# Patient Record
Sex: MALE | Race: WHITE | NOT HISPANIC OR LATINO | URBAN - METROPOLITAN AREA
[De-identification: names, ages, dates, MRNs, and addresses within clinical notes are randomized per-mention and may not be internally consistent; named-entity substitution may affect disease eponyms.]

---

## 2019-01-01 ENCOUNTER — APPOINTMENT (INPATIENT)
Dept: RADIOLOGY | Facility: HOSPITAL | Age: 59
DRG: 044 | End: 2019-01-01
Payer: COMMERCIAL

## 2019-01-01 ENCOUNTER — APPOINTMENT (EMERGENCY)
Dept: RADIOLOGY | Facility: HOSPITAL | Age: 59
DRG: 044 | End: 2019-01-01
Payer: COMMERCIAL

## 2019-01-01 ENCOUNTER — HOSPITAL ENCOUNTER (INPATIENT)
Facility: HOSPITAL | Age: 59
LOS: 1 days | DRG: 044 | End: 2019-09-30
Attending: EMERGENCY MEDICINE | Admitting: EMERGENCY MEDICINE
Payer: COMMERCIAL

## 2019-01-01 VITALS
HEIGHT: 73 IN | RESPIRATION RATE: 28 BRPM | SYSTOLIC BLOOD PRESSURE: 143 MMHG | OXYGEN SATURATION: 99 % | WEIGHT: 206.57 LBS | BODY MASS INDEX: 27.38 KG/M2 | TEMPERATURE: 96.4 F | DIASTOLIC BLOOD PRESSURE: 97 MMHG

## 2019-01-01 DIAGNOSIS — I46.9 CARDIAC ARREST (HCC): ICD-10-CM

## 2019-01-01 DIAGNOSIS — E87.2 METABOLIC ACIDOSIS: ICD-10-CM

## 2019-01-01 DIAGNOSIS — I21.09 ANTEROLATERAL MYOCARDIAL INFARCTION (HCC): Primary | ICD-10-CM

## 2019-01-01 DIAGNOSIS — E87.6 HYPOKALEMIA: ICD-10-CM

## 2019-01-01 LAB
ALBUMIN SERPL BCP-MCNC: 3.4 G/DL (ref 3.5–5)
ALBUMIN SERPL BCP-MCNC: 3.4 G/DL (ref 3.5–5)
ALP SERPL-CCNC: 107 U/L (ref 46–116)
ALP SERPL-CCNC: 115 U/L (ref 46–116)
ALT SERPL W P-5'-P-CCNC: 102 U/L (ref 12–78)
ALT SERPL W P-5'-P-CCNC: 158 U/L (ref 12–78)
ANION GAP BLD CALC-SCNC: 21 MMOL/L (ref 4–13)
ANION GAP SERPL CALCULATED.3IONS-SCNC: 14 MMOL/L (ref 4–13)
ANION GAP SERPL CALCULATED.3IONS-SCNC: 18 MMOL/L (ref 4–13)
AST SERPL W P-5'-P-CCNC: 236 U/L (ref 5–45)
AST SERPL W P-5'-P-CCNC: 97 U/L (ref 5–45)
BASE EXCESS BLDA CALC-SCNC: -14 MMOL/L (ref -2–3)
BASE EXCESS BLDA CALC-SCNC: -7.2 MMOL/L
BASOPHILS # BLD MANUAL: 0 THOUSAND/UL (ref 0–0.1)
BASOPHILS NFR MAR MANUAL: 0 % (ref 0–1)
BILIRUB DIRECT SERPL-MCNC: 0.24 MG/DL (ref 0–0.2)
BILIRUB SERPL-MCNC: 0.71 MG/DL (ref 0.2–1)
BILIRUB SERPL-MCNC: 0.87 MG/DL (ref 0.2–1)
BODY TEMPERATURE: 94.8 DEGREES FEHRENHEIT
BUN BLD-MCNC: 17 MG/DL (ref 5–25)
BUN SERPL-MCNC: 17 MG/DL (ref 5–25)
BUN SERPL-MCNC: 23 MG/DL (ref 5–25)
CA-I BLD-SCNC: 1.09 MMOL/L (ref 1.12–1.32)
CA-I BLD-SCNC: 1.09 MMOL/L (ref 1.12–1.32)
CALCIUM SERPL-MCNC: 8.4 MG/DL (ref 8.3–10.1)
CALCIUM SERPL-MCNC: 8.8 MG/DL (ref 8.3–10.1)
CHLORIDE BLD-SCNC: 101 MMOL/L (ref 100–108)
CHLORIDE SERPL-SCNC: 104 MMOL/L (ref 100–108)
CHLORIDE SERPL-SCNC: 105 MMOL/L (ref 100–108)
CK MB SERPL-MCNC: 8.1 % (ref 0–2.5)
CK MB SERPL-MCNC: 81.8 NG/ML (ref 0–5)
CK SERPL-CCNC: 1016 U/L (ref 39–308)
CO2 SERPL-SCNC: 21 MMOL/L (ref 21–32)
CO2 SERPL-SCNC: 21 MMOL/L (ref 21–32)
CREAT BLD-MCNC: 1.5 MG/DL (ref 0.6–1.3)
CREAT SERPL-MCNC: 1.93 MG/DL (ref 0.6–1.3)
CREAT SERPL-MCNC: 2.13 MG/DL (ref 0.6–1.3)
EOSINOPHIL # BLD MANUAL: 0 THOUSAND/UL (ref 0–0.4)
EOSINOPHIL NFR BLD MANUAL: 0 % (ref 0–6)
ERYTHROCYTE [DISTWIDTH] IN BLOOD BY AUTOMATED COUNT: 13.5 % (ref 11.6–15.1)
ERYTHROCYTE [DISTWIDTH] IN BLOOD BY AUTOMATED COUNT: 13.5 % (ref 11.6–15.1)
GFR SERPL CREATININE-BSD FRML MDRD: 33 ML/MIN/1.73SQ M
GFR SERPL CREATININE-BSD FRML MDRD: 37 ML/MIN/1.73SQ M
GFR SERPL CREATININE-BSD FRML MDRD: 51 ML/MIN/1.73SQ M
GLUCOSE SERPL-MCNC: 386 MG/DL (ref 65–140)
GLUCOSE SERPL-MCNC: 430 MG/DL (ref 65–140)
GLUCOSE SERPL-MCNC: 483 MG/DL (ref 65–140)
GLUCOSE SERPL-MCNC: 494 MG/DL (ref 65–140)
GLUCOSE SERPL-MCNC: 501 MG/DL (ref 65–140)
HCO3 BLDA-SCNC: 18.7 MMOL/L (ref 24–30)
HCO3 BLDA-SCNC: 20.2 MMOL/L (ref 22–28)
HCT VFR BLD AUTO: 47.3 % (ref 36.5–49.3)
HCT VFR BLD AUTO: 48 % (ref 36.5–49.3)
HCT VFR BLD CALC: 45 % (ref 36.5–49.3)
HCT VFR BLD CALC: 46 % (ref 36.5–49.3)
HGB BLD-MCNC: 13.9 G/DL (ref 12–17)
HGB BLD-MCNC: 15.2 G/DL (ref 12–17)
HGB BLDA-MCNC: 15.3 G/DL (ref 12–17)
HGB BLDA-MCNC: 15.6 G/DL (ref 12–17)
HOROWITZ INDEX BLDA+IHG-RTO: 100 MM[HG]
LACTATE SERPL-SCNC: 12.3 MMOL/L (ref 0.5–2)
LACTATE SERPL-SCNC: 6.8 MMOL/L (ref 0.5–2)
LYMPHOCYTES # BLD AUTO: 14 % (ref 14–44)
LYMPHOCYTES # BLD AUTO: 3.06 THOUSAND/UL (ref 0.6–4.47)
MAGNESIUM SERPL-MCNC: 2.5 MG/DL (ref 1.6–2.6)
MCH RBC QN AUTO: 27.5 PG (ref 26.8–34.3)
MCH RBC QN AUTO: 27.7 PG (ref 26.8–34.3)
MCHC RBC AUTO-ENTMCNC: 29.4 G/DL (ref 31.4–37.4)
MCHC RBC AUTO-ENTMCNC: 31.7 G/DL (ref 31.4–37.4)
MCV RBC AUTO: 88 FL (ref 82–98)
MCV RBC AUTO: 94 FL (ref 82–98)
MONOCYTES # BLD AUTO: 1.09 THOUSAND/UL (ref 0–1.22)
MONOCYTES NFR BLD: 5 % (ref 4–12)
MYELOCYTES NFR BLD MANUAL: 2 % (ref 0–1)
NEUTROPHILS # BLD MANUAL: 17.25 THOUSAND/UL (ref 1.85–7.62)
NEUTS BAND NFR BLD MANUAL: 9 % (ref 0–8)
NEUTS SEG NFR BLD AUTO: 70 % (ref 43–75)
NRBC BLD AUTO-RTO: 0 /100 WBCS
O2 CT BLDA-SCNC: 22.7 ML/DL (ref 16–23)
OXYHGB MFR BLDA: 98.2 % (ref 94–97)
PCO2 BLD: 21 MMOL/L (ref 21–32)
PCO2 BLD: 21 MMOL/L (ref 21–32)
PCO2 BLD: 75.4 MM HG (ref 42–50)
PCO2 BLDA: 47.6 MM HG (ref 36–44)
PEEP RESPIRATORY: 10 CM[H2O]
PH BLD: 7 [PH] (ref 7.3–7.4)
PH BLDA: 7.25 [PH] (ref 7.35–7.45)
PHOSPHATE SERPL-MCNC: 7 MG/DL (ref 2.7–4.5)
PLATELET # BLD AUTO: 424 THOUSANDS/UL (ref 149–390)
PLATELET # BLD AUTO: 488 THOUSANDS/UL (ref 149–390)
PLATELET BLD QL SMEAR: ADEQUATE
PMV BLD AUTO: 9.6 FL (ref 8.9–12.7)
PMV BLD AUTO: 9.8 FL (ref 8.9–12.7)
PO2 BLD: 48 MM HG (ref 35–45)
PO2 BLDA: 399.1 MM HG (ref 75–129)
POTASSIUM BLD-SCNC: 2.2 MMOL/L (ref 3.5–5.3)
POTASSIUM BLD-SCNC: 2.2 MMOL/L (ref 3.5–5.3)
POTASSIUM SERPL-SCNC: 2.2 MMOL/L (ref 3.5–5.3)
POTASSIUM SERPL-SCNC: 3.9 MMOL/L (ref 3.5–5.3)
PROT SERPL-MCNC: 6.4 G/DL (ref 6.4–8.2)
PROT SERPL-MCNC: 6.8 G/DL (ref 6.4–8.2)
RBC # BLD AUTO: 5.06 MILLION/UL (ref 3.88–5.62)
RBC # BLD AUTO: 5.48 MILLION/UL (ref 3.88–5.62)
RBC MORPH BLD: NORMAL
SAO2 % BLD FROM PO2: 61 % (ref 95–98)
SODIUM BLD-SCNC: 140 MMOL/L (ref 136–145)
SODIUM BLD-SCNC: 140 MMOL/L (ref 136–145)
SODIUM SERPL-SCNC: 140 MMOL/L (ref 136–145)
SODIUM SERPL-SCNC: 143 MMOL/L (ref 136–145)
SPECIMEN SOURCE: ABNORMAL
TROPONIN I SERPL-MCNC: 12 NG/ML
VENT AC: 28
VENT- AC: AC
VT SETTING VENT: 450 ML
WBC # BLD AUTO: 21.83 THOUSAND/UL (ref 4.31–10.16)
WBC # BLD AUTO: 33.45 THOUSAND/UL (ref 4.31–10.16)

## 2019-01-01 PROCEDURE — 93005 ELECTROCARDIOGRAM TRACING: CPT

## 2019-01-01 PROCEDURE — 99285 EMERGENCY DEPT VISIT HI MDM: CPT

## 2019-01-01 PROCEDURE — 36415 COLL VENOUS BLD VENIPUNCTURE: CPT | Performed by: EMERGENCY MEDICINE

## 2019-01-01 PROCEDURE — 83735 ASSAY OF MAGNESIUM: CPT | Performed by: PHYSICIAN ASSISTANT

## 2019-01-01 PROCEDURE — 80076 HEPATIC FUNCTION PANEL: CPT | Performed by: PHYSICIAN ASSISTANT

## 2019-01-01 PROCEDURE — 5A12012 PERFORMANCE OF CARDIAC OUTPUT, SINGLE, MANUAL: ICD-10-PCS | Performed by: EMERGENCY MEDICINE

## 2019-01-01 PROCEDURE — 99285 EMERGENCY DEPT VISIT HI MDM: CPT | Performed by: EMERGENCY MEDICINE

## 2019-01-01 PROCEDURE — 84100 ASSAY OF PHOSPHORUS: CPT | Performed by: PHYSICIAN ASSISTANT

## 2019-01-01 PROCEDURE — 84132 ASSAY OF SERUM POTASSIUM: CPT

## 2019-01-01 PROCEDURE — 5A1935Z RESPIRATORY VENTILATION, LESS THAN 24 CONSECUTIVE HOURS: ICD-10-PCS | Performed by: EMERGENCY MEDICINE

## 2019-01-01 PROCEDURE — 82550 ASSAY OF CK (CPK): CPT | Performed by: PHYSICIAN ASSISTANT

## 2019-01-01 PROCEDURE — 85007 BL SMEAR W/DIFF WBC COUNT: CPT | Performed by: EMERGENCY MEDICINE

## 2019-01-01 PROCEDURE — 99291 CRITICAL CARE FIRST HOUR: CPT | Performed by: INTERNAL MEDICINE

## 2019-01-01 PROCEDURE — 85027 COMPLETE CBC AUTOMATED: CPT | Performed by: EMERGENCY MEDICINE

## 2019-01-01 PROCEDURE — 4A133J1 MONITORING OF ARTERIAL PULSE, PERIPHERAL, PERCUTANEOUS APPROACH: ICD-10-PCS | Performed by: INTERNAL MEDICINE

## 2019-01-01 PROCEDURE — 4A133B1 MONITORING OF ARTERIAL PRESSURE, PERIPHERAL, PERCUTANEOUS APPROACH: ICD-10-PCS | Performed by: INTERNAL MEDICINE

## 2019-01-01 PROCEDURE — 84295 ASSAY OF SERUM SODIUM: CPT

## 2019-01-01 PROCEDURE — 70450 CT HEAD/BRAIN W/O DYE: CPT

## 2019-01-01 PROCEDURE — 82553 CREATINE MB FRACTION: CPT | Performed by: PHYSICIAN ASSISTANT

## 2019-01-01 PROCEDURE — NC001 PR NO CHARGE: Performed by: PHYSICIAN ASSISTANT

## 2019-01-01 PROCEDURE — 80053 COMPREHEN METABOLIC PANEL: CPT | Performed by: EMERGENCY MEDICINE

## 2019-01-01 PROCEDURE — 94002 VENT MGMT INPAT INIT DAY: CPT

## 2019-01-01 PROCEDURE — 82947 ASSAY GLUCOSE BLOOD QUANT: CPT

## 2019-01-01 PROCEDURE — 80048 BASIC METABOLIC PNL TOTAL CA: CPT | Performed by: PHYSICIAN ASSISTANT

## 2019-01-01 PROCEDURE — 36620 INSERTION CATHETER ARTERY: CPT | Performed by: PHYSICIAN ASSISTANT

## 2019-01-01 PROCEDURE — 82803 BLOOD GASES ANY COMBINATION: CPT

## 2019-01-01 PROCEDURE — 92950 HEART/LUNG RESUSCITATION CPR: CPT

## 2019-01-01 PROCEDURE — 80047 BASIC METABLC PNL IONIZED CA: CPT

## 2019-01-01 PROCEDURE — 83605 ASSAY OF LACTIC ACID: CPT | Performed by: EMERGENCY MEDICINE

## 2019-01-01 PROCEDURE — 94760 N-INVAS EAR/PLS OXIMETRY 1: CPT

## 2019-01-01 PROCEDURE — 85014 HEMATOCRIT: CPT

## 2019-01-01 PROCEDURE — 72125 CT NECK SPINE W/O DYE: CPT

## 2019-01-01 PROCEDURE — 85027 COMPLETE CBC AUTOMATED: CPT | Performed by: PHYSICIAN ASSISTANT

## 2019-01-01 PROCEDURE — 03HB33Z INSERTION OF INFUSION DEVICE INTO RIGHT RADIAL ARTERY, PERCUTANEOUS APPROACH: ICD-10-PCS | Performed by: INTERNAL MEDICINE

## 2019-01-01 PROCEDURE — 82805 BLOOD GASES W/O2 SATURATION: CPT | Performed by: PHYSICIAN ASSISTANT

## 2019-01-01 PROCEDURE — 71045 X-RAY EXAM CHEST 1 VIEW: CPT

## 2019-01-01 PROCEDURE — 82948 REAGENT STRIP/BLOOD GLUCOSE: CPT

## 2019-01-01 PROCEDURE — 82330 ASSAY OF CALCIUM: CPT

## 2019-01-01 PROCEDURE — 84484 ASSAY OF TROPONIN QUANT: CPT | Performed by: PHYSICIAN ASSISTANT

## 2019-01-01 PROCEDURE — 83605 ASSAY OF LACTIC ACID: CPT | Performed by: PHYSICIAN ASSISTANT

## 2019-01-01 RX ORDER — POTASSIUM CHLORIDE 14.9 MG/ML
20 INJECTION INTRAVENOUS
Status: COMPLETED | OUTPATIENT
Start: 2019-01-01 | End: 2019-01-01

## 2019-01-01 RX ORDER — AMIODARONE HYDROCHLORIDE 50 MG/ML
2 INJECTION, SOLUTION INTRAVENOUS ONCE
Status: COMPLETED | OUTPATIENT
Start: 2019-01-01 | End: 2019-01-01

## 2019-01-01 RX ORDER — HEPARIN SODIUM 5000 [USP'U]/ML
5000 INJECTION, SOLUTION INTRAVENOUS; SUBCUTANEOUS EVERY 8 HOURS SCHEDULED
Status: DISCONTINUED | OUTPATIENT
Start: 2019-01-01 | End: 2019-01-01

## 2019-01-01 RX ORDER — ACETAMINOPHEN 160 MG/5ML
650 SUSPENSION, ORAL (FINAL DOSE FORM) ORAL EVERY 4 HOURS PRN
Status: DISCONTINUED | OUTPATIENT
Start: 2019-01-01 | End: 2019-09-30 | Stop reason: HOSPADM

## 2019-01-01 RX ORDER — LISINOPRIL 40 MG/1
40 TABLET ORAL DAILY
COMMUNITY

## 2019-01-01 RX ORDER — CHLORHEXIDINE GLUCONATE 0.12 MG/ML
15 RINSE ORAL EVERY 12 HOURS SCHEDULED
Status: DISCONTINUED | OUTPATIENT
Start: 2019-01-01 | End: 2019-09-30 | Stop reason: HOSPADM

## 2019-01-01 RX ORDER — SIMVASTATIN 40 MG
40 TABLET ORAL
COMMUNITY

## 2019-01-01 RX ORDER — EPINEPHRINE 0.1 MG/ML
5 SYRINGE (ML) INJECTION ONCE
Status: COMPLETED | OUTPATIENT
Start: 2019-01-01 | End: 2019-01-01

## 2019-01-01 RX ORDER — AMLODIPINE BESYLATE 10 MG/1
10 TABLET ORAL DAILY
COMMUNITY

## 2019-01-01 RX ORDER — CHLORTHALIDONE 25 MG/1
25 TABLET ORAL DAILY
COMMUNITY

## 2019-01-01 RX ORDER — SODIUM BICARBONATE 84 MG/ML
INJECTION, SOLUTION INTRAVENOUS CODE/TRAUMA/SEDATION MEDICATION
Status: COMPLETED | OUTPATIENT
Start: 2019-01-01 | End: 2019-01-01

## 2019-01-01 RX ORDER — SODIUM CHLORIDE, SODIUM GLUCONATE, SODIUM ACETATE, POTASSIUM CHLORIDE, MAGNESIUM CHLORIDE, SODIUM PHOSPHATE, DIBASIC, AND POTASSIUM PHOSPHATE .53; .5; .37; .037; .03; .012; .00082 G/100ML; G/100ML; G/100ML; G/100ML; G/100ML; G/100ML; G/100ML
125 INJECTION, SOLUTION INTRAVENOUS CONTINUOUS
Status: DISCONTINUED | OUTPATIENT
Start: 2019-01-01 | End: 2019-01-01

## 2019-01-01 RX ADMIN — EPINEPHRINE 1 MG: 0.1 INJECTION, SOLUTION ENDOTRACHEAL; INTRACARDIAC; INTRAVENOUS at 16:04

## 2019-01-01 RX ADMIN — POTASSIUM CHLORIDE 20 MEQ: 14.9 INJECTION, SOLUTION INTRAVENOUS at 20:52

## 2019-01-01 RX ADMIN — SODIUM BICARBONATE 50 MEQ: 84 INJECTION, SOLUTION INTRAVENOUS at 16:03

## 2019-01-01 RX ADMIN — SODIUM CHLORIDE, SODIUM GLUCONATE, SODIUM ACETATE, POTASSIUM CHLORIDE AND MAGNESIUM CHLORIDE 125 ML/HR: 526; 502; 368; 37; 30 INJECTION, SOLUTION INTRAVENOUS at 18:41

## 2019-01-01 RX ADMIN — NOREPINEPHRINE BITARTRATE 25 MCG/MIN: 1 INJECTION INTRAVENOUS at 18:39

## 2019-01-01 RX ADMIN — EPINEPHRINE 1 MG: 0.1 INJECTION, SOLUTION ENDOTRACHEAL; INTRACARDIAC; INTRAVENOUS at 16:00

## 2019-01-01 RX ADMIN — SODIUM BICARBONATE 125 ML/HR: 84 INJECTION, SOLUTION INTRAVENOUS at 20:51

## 2019-01-01 RX ADMIN — FAMOTIDINE 20 MG: 10 INJECTION, SOLUTION INTRAVENOUS at 20:51

## 2019-01-01 RX ADMIN — CHLORHEXIDINE GLUCONATE 0.12% ORAL RINSE 15 ML: 1.2 LIQUID ORAL at 20:52

## 2019-01-01 RX ADMIN — POTASSIUM CHLORIDE 20 MEQ: 14.9 INJECTION, SOLUTION INTRAVENOUS at 16:23

## 2019-01-01 RX ADMIN — NOREPINEPHRINE BITARTRATE 19 MCG/MIN: 1 INJECTION INTRAVENOUS at 19:50

## 2019-01-01 RX ADMIN — NOREPINEPHRINE BITARTRATE 5 MCG/MIN: 1 INJECTION INTRAVENOUS at 16:08

## 2019-09-29 PROBLEM — J96.01 ACUTE RESPIRATORY FAILURE WITH HYPOXIA AND HYPERCAPNIA (HCC): Status: ACTIVE | Noted: 2019-01-01

## 2019-09-29 PROBLEM — I49.01 CARDIAC ARREST WITH VENTRICULAR FIBRILLATION (HCC): Status: ACTIVE | Noted: 2019-01-01

## 2019-09-29 PROBLEM — E87.6 HYPOKALEMIA: Status: ACTIVE | Noted: 2019-01-01

## 2019-09-29 PROBLEM — I21.3 STEMI (ST ELEVATION MYOCARDIAL INFARCTION) (HCC): Status: ACTIVE | Noted: 2019-01-01

## 2019-09-29 PROBLEM — E87.2 LACTIC ACIDOSIS: Status: ACTIVE | Noted: 2019-01-01

## 2019-09-29 PROBLEM — I46.9 CARDIAC ARREST WITH VENTRICULAR FIBRILLATION (HCC): Status: ACTIVE | Noted: 2019-01-01

## 2019-09-29 PROBLEM — R73.9 HYPERGLYCEMIA: Status: ACTIVE | Noted: 2019-01-01

## 2019-09-29 PROBLEM — J96.02 ACUTE RESPIRATORY FAILURE WITH HYPOXIA AND HYPERCAPNIA (HCC): Status: ACTIVE | Noted: 2019-01-01

## 2019-09-29 PROBLEM — E87.2 METABOLIC ACIDOSIS: Status: ACTIVE | Noted: 2019-01-01

## 2019-09-29 PROBLEM — R57.9 SHOCK (HCC): Status: ACTIVE | Noted: 2019-01-01

## 2019-09-29 PROBLEM — N17.9 AKI (ACUTE KIDNEY INJURY) (HCC): Status: ACTIVE | Noted: 2019-01-01

## 2019-09-29 NOTE — ED ATTENDING ATTESTATION
9/29/2019  Sandi Phillip DO, saw and evaluated the patient  I have discussed the patient with the resident/non-physician practitioner and agree with the resident's/non-physician practitioner's findings, Plan of Care, and MDM as documented in the resident's/non-physician practitioner's note, except where noted  All available labs and Radiology studies were reviewed  I was present for key portions of any procedure(s) performed by the resident/non-physician practitioner and I was immediately available to provide assistance  At this point I agree with the current assessment done in the Emergency Department  I have conducted an independent evaluation of this patient a history and physical is as follows:    62 yo male w/unknown PMH BIBA after a call for unresponsive person  EMS arrived, found pt to be in cardiac arrest  Initial rhythm was VT, followed by VF  Pt was defibrillated multiple times followed by ROSC  prehospital ECG showed large anterolateral ST elevation  Pt lost pulses 2 minutes PTA, chest compressions re-initiated  Regained ROSC in ED  ECG shows persistent ST elevation  An MI alert was activated  Lost pulses again in ED, manual chest compressions re-initiated  Cardiology (dr Mane Loza) called back, we discussed case and it was agreed that pt is not a candidate for PCI given severity of illness and unknown down time leading to very low likelihood of functional neurologic outcome  At the time of writing this note, pt does have ROSC and was maintaining a BP in the 27P systolic  Levophed gtt started, titrate to SBP > 80  Cooling measures initiated as well  Unfortunately, pt arrived with fixed+dilated pupils, absent gag  These are unchanged after treatment in the ED  [facility], continued cooling, vent management, continued vasopressor support       Ultimately, it is likely that this unfortunate patient suffered a severe anoxic brain injury due to non perfusing rhythm prior to arrival, it is unlikely that any intervention will lead to any improved outcome        ED Course         Critical Care Time  Procedures

## 2019-09-29 NOTE — RESPIRATORY THERAPY NOTE
RT Ventilator Management Note  Farhan Perez 63 y o  male MRN: [id number]  Unit/Bed#: [facility] 12 Encounter: [id number]      Daily Screen     No data found in the last 10 encounters  Physical Exam:   Assessment Type: Assess only  General Appearance: Unresponsive  Respiratory Pattern: Assisted  Chest Assessment: Chest expansion symmetrical  Bilateral Breath Sounds: Diminished, Clear      Resp Comments: pt arrivved via ems  pt intubated in the field with a 7 0 ETT 24 at the lip  pt bagged with 100% fio2 via ambu bag while compressions were being done  pt placed on ventilator once pulses returned  will continue to monitor  pt placed on respiratory protocol for ventilator management

## 2019-09-29 NOTE — H&P
History & Physical Exam - Critical Care   Keyla Bo 63 y o  male MRN: [id number]  Unit/Bed#: [facility] 12 Encounter: [id number]      Assessment/Plan:  Cardiac arrest  STEMI  Acute hypoxic and hypercapnic respiratory failure  Lactic acidosis  Anion gapped metabolic acidosis  Hyperglycemia  Acute kidney injury  Abnormal liver function test  Hypokalemia  Hypoalbuminemia    Severe neurologic injury  Exam is consistent with brain death; however given metabolic arrangements, will need to repeat exam with formal apnea testing   - Cont  Ventilator support  Check ABG and adjust respiratory rate and oxygen as indicated  - Place TLC  Cont  IV pressors to keep MAP >65  - Place arterial line  - IVF with bicarbonate hydration  - Insulin drip  - Replete electrolytes  - CT head  If negative, can consider systemic heparin   - NPO  - No statin given abnormal LFTs  - DVT Px  - DNR    Discussed at length with patient's wife and father in law  Explained the severity of his illness  He is likely brain dead with no meaningful chance of recovery  They would like to change his code status to DNR  I encouraged them to tell the patient's mother, sibling, and others who would like to see him  All concerns and questions addressed  I also discussed his case with gift of life  Critical Care Time: 45 minutes  Documented critical care time excludes any procedures documented elsewhere  It also excludes any family updates    _____________________________________________________________________      HPI:    Keyla Bo is a 63 y o  male who presents after being found down at home  History is from the patient's wife and the ER record since the patient is intubated and unable to provide his own history  Mr Nikki Bowie has a known history of hypertension and hyperlipidemia  He was in his usual state of health today  His family spoke to him at [employer]  He was supposed to be at work at noon  When they arrived home at 2:30PM, they found him naked on the floor  EMS was called  Upon the arrival of EMS  EMS found the patient in ventricular tachycardia and v fib  He received 3 shocks 5 doses of Epi  In the ER, he had ROSC  His EKG revealed ST elevations and an MI alert was called  The patient lost pulses and required ACLS protocol several times  ROSC was obtained and he was transferred to the ICU for further care  In the ICU, the patient is found comatose, unresponsive, and on the ventilator  He is on Norepinephrine  Review of Systems:  Unable to obtain due to intubation, unresponsive  Historical Information   Past Medical History:   Diagnosis Date    Diabetes mellitus ([facility] [address] )     PRE-DIABETES    Hypertension      Past Surgical History:   Procedure Laterality Date    ABDOMINAL SURGERY      GALLBLADDER    JOINT REPLACEMENT       Social History   Social History     Substance and Sexual Activity   Alcohol Use Not Currently     Social History     Substance and Sexual Activity   Drug Use Never     Social History     Tobacco Use   Smoking Status Current Every Day Smoker    Packs/day:  00    Years: 15 00    Pack years: 15     Types: Cigarettes   Smokeless Tobacco Never Used     Smokes 0 5ppd x 40+ years    No current drug or alcohol use  Works at [employer]    Family History:   No known cardiac disease  His father  of cancer    Medications:  Pertinent medications were reviewed    Current Facility-Administered Medications:  acetaminophen 650 mg Oral Q4H PRN Jenies Bougie, PA-C    amiodarone (FOR EMS ONLY) 2 each Does not apply Once Jenise Bougie, PA-C    chlorhexidine 15 mL Swish & Spit Q12H [address] Jenise Bougie, PA-C    EPINEPHrine (FOR EMS ONLY) 5 each Does not apply Once Jenise Bougie, PA-C    epinephrine 1-10 mcg/min Intravenous Titrated Jenise Bougie, PA-C    famotidine 20 mg Intravenous BID Jenise Bougie, PA-C    heparin (porcine) 5,000 Units Subcutaneous [facility] Jenise Bougie, PA-C    multi-electrolyte 125 mL/hr Intravenous Continuous Espiridion Rakes, PA-C    norepinephrine 5 mcg/min Intravenous Titrated Espiridion Rakes, PA-C Last Rate: 28 mcg/min (09/29/19 1733)   norepinephrine 1-30 mcg/min Intravenous Titrated Espiridion Rakes, PA-C    potassium chloride 20 mEq Intravenous Q2H Espiridion Rakes, PA-C Last Rate: 20 mEq (09/29/19 1623)   sodium bicarbonate infusion 125 mL/hr Intravenous Continuous Espiridion Rakes, PA-C    vasopressin (PITRESSIN) in 0 9 % sodium chloride 100 mL 0 04 Units/min Intravenous Continuous Espiridion Rakes, PA-C          Allergies   Allergen Reactions    Penicillins      UNKNOWN PER WIFE         Vitals:   /77 (BP Location: Left arm)   Pulse 96   Temp (!) 94 6 °F (34 8 °C) (Probe)   Resp (!) 28   Ht 6' 1" (1 854 m)   Wt 93 7 kg (206 lb 9 1 oz)   SpO2 98%   BMI 27 25 kg/m²   Body mass index is 27 25 kg/m²  SpO2: 98 %,   SpO2 Activity: At Rest,   O2 Device: Ventilator      Intake/Output Summary (Last 24 hours) at 9/29/2019 1820  Last data filed at 9/29/2019 1733  Gross per 24 hour   Intake 106 62 ml   Output 250 ml   Net -143 38 ml     Invasive Devices     Peripheral Intravenous Line            Peripheral IV 09/29/19 Left Antecubital less than 1 day    Peripheral IV 09/29/19 Left Wrist less than 1 day    Peripheral IV 09/29/19 Right Antecubital less than 1 day          Drain            NG/OG/Enteral Tube Orogastric Center mouth less than 1 day    Urethral Catheter Temperature probe less than 1 day          Airway            ETT  7 mm less than 1 day                Physical Exam:  Gen: WDWN, unresponsive  HEENT: NCAT; anicteric sclera b/l; MMM, ETT in place  Neck: supple  Chest: Assisted breathing, clear b/l  CVS: Regular  Abd: soft, nd  Ext: No c/c/e  Neuro: Unresponsive, no response to painful or verbal stimuli, apneic, no gag reflex, no corneal reflex, no pupillary reflex; absent cold caloric reflex  Skin: warm, dry  Diagnostic Data:  Lab: I have personally reviewed pertinent lab results  , CBC:  Results from last 7 days   Lab Units 09/29/19  1607   WBC Thousand/uL 21 83*   HEMOGLOBIN g/dL 13 9   HEMATOCRIT % 47 3   PLATELETS Thousands/uL 424*      CMP: Lab Results   Component Value Date    SODIUM 143 09/29/2019    K 2 2 (LL) 09/29/2019     09/29/2019    CO2 21 09/29/2019    CO2 21 09/29/2019    CO2 21 09/29/2019    BUN 17 09/29/2019    CREATININE 1 93 (H) 09/29/2019    GLUCOSE 501 (HH) 09/29/2019    GLUCOSE 494 (H) 09/29/2019    CALCIUM 8 8 09/29/2019    AST 97 (H) 09/29/2019     (H) 09/29/2019    ALKPHOS 107 09/29/2019    EGFR 37 09/29/2019    EGFR 51 09/29/2019     Labs reviewed by me personally reveal respiratory and metabolic acidosis; anion gapped acidosis, elevated creatinine, hyperglycemia, hypoalbuminemia, hypokalemia, leukocytosis, bandemia  Imaging: I have personally reviewed the pertinent imaging studies on the PACS system  ETT in place, clear b/l lungs  Cardiac/EKG/telemetry/Echo:       EKG consistent with STEMI       VTE Prophylaxis: Heparin    Code Status: Level 1 - Full Code    Barbara Parisi DO    Portions of the record may have been created with voice recognition software  Occasional wrong word or "sound a like" substitutions may have occurred due to the inherent limitations of voice recognition software  Read the chart carefully and recognize, using context, where substitutions have occurred

## 2019-09-29 NOTE — PROCEDURES
Arterial Line Insertion  Date/Time: 9/29/2019 6:35 PM  Performed by: Kevin Miller PA-C  Authorized by: Kevin Miller PA-C     Patient location:  Bedside  Other Assisting Provider: No    Consent:     Consent obtained:  Verbal (Obtained by Dr Pb Odonnell)    Consent given by:  Spouse  Universal protocol:     Patient identity confirmed:  Hospital-assigned identification number  Indications:     Indications: hemodynamic monitoring and multiple ABGs    Pre-procedure details:     Skin preparation:  Chlorhexidine    Preparation: Patient was prepped and draped in sterile fashion    Anesthesia (see MAR for exact dosages): Anesthesia method:  None  Procedure details:     Location / Tip of Catheter:  Radial    Laterality:  Right    Needle gauge:  20 G    Placement technique:  Percutaneous    Number of attempts:  1    Successful placement: yes      Transducer: waveform confirmed    Post-procedure details:     Post-procedure:  Sterile dressing applied and sutured    CMS:  Normal    Patient tolerance of procedure:   Tolerated well, no immediate complications

## 2019-09-29 NOTE — RESPIRATORY THERAPY NOTE
RT Ventilator Management Note  Brice Nichols 63 y o  male MRN: [id number]  [facility]: [id number]      Daily Screen     No data found in the last 10 encounters  Physical Exam:   Assessment Type: Assess only  General Appearance: Unresponsive  Respiratory Pattern: Assisted  Chest Assessment: Chest expansion symmetrical  Bilateral Breath Sounds: Diminished, Clear      Resp Comments: pt transported from ER to MICU 12  and placed back on ventilator at current settings   changes made by enoc barksdale

## 2019-09-29 NOTE — PROGRESS NOTES
Pastoral Care Progress Note    2019  Patient: Sonido Sun : 1960  Admission Date & Time: 2019 1551  MRN: [id number] [facility]: [id number]                     Chaplaincy Interventions Utilized:   Empowerment: Normalized experience of patient/family    Exploration: Explored hope, Explored relational needs & resources and Explored spiritual needs & resources    Collaboration: Advocated for patient/family and Consulted with interdisciplinary team    Relationship Building: Listened empathically and Provided silent and supportive presence    Ritual: Isabel Riley provided sacrament of the sick            Chaplaincy Outcomes Achieved:  Distress reduced, Expressed gratitude and Expressed peace          Spiritual Coping Strategies Utilized:   Spiritual comfort

## 2019-09-29 NOTE — RESPIRATORY THERAPY NOTE
RT Protocol Note  Ilene Delong 63 y o  male MRN: [id number]  Unit/Bed#: [facility] 12 Encounter: [id number]    Assessment    Active Problems:    * No active hospital problems  *      Home Pulmonary Medications:  None listed       No past medical history on file  Social History     Socioeconomic History    Marital status: Unknown     Spouse name: Not on file    Number of children: Not on file    Years of education: Not on file    Highest education level: Not on file   Occupational History    Not on file   Social Needs    Financial resource strain: Not on file    Food insecurity:     Worry: Not on file     Inability: Not on file    Transportation needs:     Medical: Not on file     Non-medical: Not on file   Tobacco Use    Smoking status: Not on file   Substance and Sexual Activity    Alcohol use: Not on file    Drug use: Not on file    Sexual activity: Not on file   Lifestyle    Physical activity:     Days per week: Not on file     Minutes per session: Not on file    Stress: Not on file   Relationships    Social connections:     Talks on phone: Not on file     Gets together: Not on file     Attends Confucianism service: Not on file     Active member of club or organization: Not on file     Attends meetings of clubs or organizations: Not on file     Relationship status: Not on file    Intimate partner violence:     Fear of current or ex partner: Not on file     Emotionally abused: Not on file     Physically abused: Not on file     Forced sexual activity: Not on file   Other Topics Concern    Not on file   Social History Narrative    Not on file       Subjective         Objective    Physical Exam:   Assessment Type: Assess only  General Appearance: Unresponsive  Respiratory Pattern: Assisted  Chest Assessment: Chest expansion symmetrical  Bilateral Breath Sounds: Diminished, Clear    Vitals:  Blood pressure (!) 69/47, pulse 91, resp  rate 20, SpO2 98 %            Imaging and other studies: I have personally reviewed pertinent reports  Plan    Respiratory Plan: Vent/NIV/HFNC        Resp Comments: pt arrivved via ems  pt intubated in the field with a 7 0 ETT 24 at the lip  pt bagged with 100% fio2 via ambu bag while compressions were being done  pt placed on ventilator once pulses returned  will continue to monitor  pt placed on respiratory protocol for ventilator management

## 2019-09-29 NOTE — ED RE-EVALUATION NOTE
Prior to going to ICU, I spoke at length with pts wife, son  I emphasized that pt has likely suffered a severe anoxic brain injury  I explained that he is likely brain dead at this point and that although he has a pulse right now, there is a good chance that he will lose pulses in the next few hours  His chance of survival is very low, and his chance of survival with functional neurologic outcome is basically 0%  Additionally, family had provided additional history  Pts wife states she called him at 11am today and he sounded fine  She came home to find the doors locked, house quiet  Gained entry and went upstairs to find him upstairs, not dressed and on the floor  She felt that he was trying to lift his head when she found him but was not saying anything to her or moving his extremities  She says he was supposed to go to work at noon  She notes the shower was not turned on  She thinks he was trying to get ready to go to work but wasn't able to finish  I asked about things on the floor near him such as bottles or other objects, she said there weren't any  He has a hx of HTN and probably DM but he never returned to be tested for DM  She notes that his family has an extensive hx of DM  During conversation, pts wife asked pts brother if she should "tell us about the garage"  He said no, and that the garage door was broken  I didn't ask anything else about this  Family had been brought back to see pt while in ED, questions answered  Plan for now is to transport to ICU and continue full support  they were made aware that they if desired, they could transition to comfort care during admission       Glenny Phillip,   09/29/19 9991

## 2019-09-29 NOTE — ED PROVIDER NOTES
History  No chief complaint on file  Patient is a 73-year-old male who presents for cardiac arrest status post ROSC  Patient is unresponsive and unable to provide further history  Per EMS, they were called by wife after she found him unresponsive on the floor  Patient initially was minimally responsive on their arrival but went into VF arrest   Pulses returned after CPR with several shocks delivered  Patient was intubated at the scene  On wife's arrival, she reports that patient had last been spoken to around 11 a m  Js Yonatan Patient was supposed to have gone to work at noon  Around 2:00 p m , wife and son arrived home and found the dorsal locked  On entering the home, they found the patient undressed and lying on the floor with a laceration to the forehead  Patient was mumbling but could not provide any further history at that time  Prior to Admission Medications   Prescriptions Last Dose Informant Patient Reported? Taking? amLODIPine (NORVASC) 10 mg tablet   Yes Yes   Sig: Take 10 mg by mouth daily   chlorthalidone 25 mg tablet   Yes Yes   Sig: Take 25 mg by mouth daily   lisinopril (ZESTRIL) 40 mg tablet   Yes Yes   Sig: Take 40 mg by mouth daily   simvastatin (ZOCOR) 40 mg tablet   Yes Yes   Sig: Take 40 mg by mouth daily at bedtime      Facility-Administered Medications: None       Past Medical History:   Diagnosis Date    Diabetes mellitus ([facility] [address] )     PRE-DIABETES    Hypertension        Past Surgical History:   Procedure Laterality Date    ABDOMINAL SURGERY      GALLBLADDER    JOINT REPLACEMENT         History reviewed  No pertinent family history  I have reviewed and agree with the history as documented      Social History     Tobacco Use    Smoking status: Current Every Day Smoker     Packs/day: 1 00     Years: 15 00     Pack years: 15 00     Types: Cigarettes    Smokeless tobacco: Never Used   Substance Use Topics    Alcohol use: Not Currently    Drug use: Never        Review of Systems Unable to perform ROS: Patient unresponsive       Physical Exam  ED Triage Vitals   Temperature Pulse Respirations Blood Pressure SpO2   09/29/19 1733 09/29/19 1554 09/29/19 1555 09/29/19 1555 09/29/19 1555   (!) 94 6 °F (34 8 °C) 87 16 120/90 95 %      Temp Source Heart Rate Source Patient Position - Orthostatic VS BP Location FiO2 (%)   09/29/19 1733 09/29/19 1648 09/29/19 1648 09/29/19 1648 09/29/19 1733   Probe Monitor Lying Left arm 100      Pain Score       --                    Orthostatic Vital Signs  Vitals:    09/29/19 2126 09/29/19 2130 09/29/19 2144 09/29/19 2145   BP:       Pulse: 94 92 (!) 0 (!) 0   Patient Position - Orthostatic VS:           Physical Exam   Constitutional: He appears ill  He is intubated  Backboard in place  HENT:   Head:       Right Ear: External ear normal    Left Ear: External ear normal    ETT in place   Eyes: No scleral icterus  Pupils midline, fixed and dilated  No corneal reflex   Cardiovascular: Normal rate, regular rhythm and normal heart sounds  Pulmonary/Chest: Breath sounds normal  He is intubated  He is in respiratory distress  Abdominal: Soft  Normal appearance  There is guarding  Genitourinary: Testes normal and penis normal    Musculoskeletal: He exhibits no edema or deformity  Neurological: He is unresponsive  GCS eye subscore is 1  GCS verbal subscore is 1  GCS motor subscore is 1  No gag/cough reflex   Skin: Skin is warm and dry  Laceration noted  Nursing note and vitals reviewed        ED Medications  Medications   EPINEPHrine (ADRENALIN) injection (1 mg Intravenous Given 9/29/19 1600)   sodium bicarbonate 50 mEq/50 mL injection (50 mEq Intravenous Given 9/29/19 1603)   EPINEPHrine (ADRENALIN) injection (1 mg Intravenous Given 9/29/19 1604)   potassium chloride 20 mEq IVPB (premix) (20 mEq Intravenous New Bag 9/29/19 2052)   EPINEPHrine (FOR EMS ONLY) (ADRENALIN) injection 5 mg (0 mg Does not apply Given to EMS 9/29/19 1924)   amiodarone (FOR EMS ONLY) 150 mg/3 mL injection 300 mg (0 mg Does not apply Given to EMS 9/29/19 1924)       Diagnostic Studies  Results Reviewed     Procedure Component Value Units Date/Time    Troponin I [[id number]]  (Abnormal) Collected:  09/29/19 1850    Lab Status:  Final result Specimen:  Blood from Arm, Right Updated:  09/29/19 1918     Troponin I 12 00 ng/mL     CBC and differential [[id number]]  (Abnormal) Collected:  09/29/19 1607    Lab Status:  Final result Specimen:  Blood from Arm, Left Updated:  09/29/19 1726     WBC 21 83 Thousand/uL      RBC 5 06 Million/uL      Hemoglobin 13 9 g/dL      Hematocrit 47 3 %      MCV 94 fL      MCH 27 5 pg      MCHC 29 4 g/dL      RDW 13 5 %      MPV 9 8 fL      Platelets 425 Thousands/uL      nRBC 0 /100 WBCs     Narrative: This is an appended report  These results have been appended to a previously verified report  Lactic acid, plasma [[id number]]  (Abnormal) Collected:  09/29/19 1640    Lab Status:  Final result Specimen:  Blood from Arm, Left Updated:  09/29/19 1712     LACTIC ACID 12 3 mmol/L     Narrative:       Result may be elevated if tourniquet was used during collection      Comprehensive metabolic panel [[id number]]  (Abnormal) Collected:  09/29/19 1607    Lab Status:  Final result Specimen:  Blood from Arm, Left Updated:  09/29/19 1654     Sodium 143 mmol/L      Potassium 2 2 mmol/L      Chloride 104 mmol/L      CO2 21 mmol/L      ANION GAP 18 mmol/L      BUN 17 mg/dL      Creatinine 1 93 mg/dL      Glucose 483 mg/dL      Calcium 8 8 mg/dL      AST 97 U/L       U/L      Alkaline Phosphatase 107 U/L      Total Protein 6 4 g/dL      Albumin 3 4 g/dL      Total Bilirubin 0 71 mg/dL      eGFR 37 ml/min/1 73sq m     Narrative:       [place] guidelines for Chronic Kidney Disease (CKD):     Stage 1 with normal or high GFR (GFR > 90 mL/min/1 73 square meters)    Stage 2 Mild CKD (GFR = 60-89 mL/min/1 73 square meters)    Stage 3A Moderate CKD (GFR = 45-59 mL/min/1 73 square meters)    Stage 3B Moderate CKD (GFR = 30-44 mL/min/1 73 square meters)    Stage 4 Severe CKD (GFR = 15-29 mL/min/1 73 square meters)    Stage 5 End Stage CKD (GFR <15 mL/min/1 73 square meters)  Note: GFR calculation is accurate only with a steady state creatinine    POCT Blood Gas (CG8+) [[id number]]  (Abnormal) Collected:  09/29/19 1559    Lab Status:  Final result Specimen:  Venous Updated:  09/29/19 1604     ph, Med ISTAT 7 003     pCO2, Med i-STAT 75 4 mm HG      pO2, Med i-STAT 48 0 mm HG      BE, i-STAT -14 mmol/L      HCO3, Med i-STAT 18 7 mmol/L      CO2, i-STAT 21 mmol/L      O2 Sat, i-STAT 61 %      SODIUM, I-STAT 140 mmol/l      Potassium, i-STAT 2 2 mmol/L      Calcium, Ionized i-STAT 1 09 mmol/L      Hct, i-STAT 45 %      Hgb, i-STAT 15 3 g/dl      Glucose, i-STAT 494 mg/dl      Specimen Type VENOUS    POCT Chem 8+ [[id number]]  (Abnormal) Collected:  09/29/19 1559    Lab Status:  Final result Specimen:  Venous Updated:  09/29/19 1603     SODIUM, I-STAT 140 mmol/l      Potassium, i-STAT 2 2 mmol/L      Chloride, istat 101 mmol/L      CO2, i-STAT 21 mmol/L      Anion Gap, i-STAT 21 mmol/L      Calcium, Ionized i-STAT 1 09 mmol/L      BUN, I-STAT 17 mg/dl      Creatinine, i-STAT 1 5 mg/dl      eGFR 51 ml/min/1 73sq m      Glucose, i-STAT 501 mg/dl      Hct, i-STAT 46 %      Hgb, i-STAT 15 6 g/dl      Specimen Type VENOUS    Narrative:       National [employer] guidelines for Chronic Kidney Disease (CKD):     Stage 1 with normal or high GFR (GFR > 90 mL/min/1 73 square meters)    Stage 2 Mild CKD (GFR = 60-89 mL/min/1 73 square meters)    Stage 3A Moderate CKD (GFR = 45-59 mL/min/1 73 square meters)    Stage 3B Moderate CKD (GFR = 30-44 mL/min/1 73 square meters)    Stage 4 Severe CKD (GFR = 15-29 mL/min/1 73 square meters)    Stage 5 End Stage CKD (GFR <15 mL/min/1 73 square meters)  Note: GFR calculation is accurate only with a steady state creatinine                 CT head wo contrast   Final Result by Marlon Flores MD (09/29 8287)   1  Diffuse subarachnoid and intraventricular hemorrhage likely related to ruptured aneurysm  2   Focal hyperdensity at the prepontine cistern measuring up to 1 9 cm in size likely aneurysm 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 tip or posterior cerebral artery  I personally discussed this study with Sahil Mike on 9/29/2019 at 8:55 PM                      Workstation performed: [id number]         CT spine cervical wo contrast   Final Result by Marlon Flores MD (09/29 2101)      No cervical spine fracture or traumatic malalignment  Evidence of subarachnoid hemorrhage in the proximal spinal canal   See the separate head CT report for further details  Workstation performed: [id number]         XR chest 1 view portable   Final Result by Dennys Amaral DO (09/30 0818)   Lung bases excluded from x-ray  Endotracheal tube in good position  NG tube tip is not seen below the distal esophagus  Mild pulmonary edema  Workstation performed: [id number]               Procedures  Procedures        ED Course                               MDM  Number of Diagnoses or Management Options  Anterolateral myocardial infarction [facility]):   Cardiac arrest [facility]): Hypokalemia:   Metabolic acidosis:   Diagnosis management comments: Patient is a 78-year-old male who presents for cardiac arrest status post ROSC  Exam shows unresponsive male with fixed dilated pupils, absent corneal reflex or cough reflex, hypotension  EKG shows large ST elevation in anterior leads concerning for STEMI  During emergency department evaluation, patient became profoundly hypotensive than lost pulses; PEA on monitor  CPR was performed and ROSC was again achieved  Case discussed with Cardiology, who felt patient was too unstable for cath lab    Placed on norepinephrine drip, admitted to Medical ICU for further care       Disposition  Final diagnoses:   Anterolateral myocardial infarction [facility])   Cardiac arrest ([facility] [address] )   Hypokalemia   Metabolic acidosis     Time reflects when diagnosis was documented in both MDM as applicable and the Disposition within this note     Time User Action Codes Description Comment    9/29/2019  5:06 PM Mildred Phillip Bc Add [I21 09] Anterolateral myocardial infarction ([facility] [address] )     9/29/2019  5:06 PM PesterMildred Bc Add [I46 9] [facility] )     9/29/2019  5:07 PM PesterMildred Bc Add [E87 6] Hypokalemia     9/29/2019  5:07 PM PesterMildred Bc Add [U43 9] Metabolic acidosis       ED Disposition     ED Disposition Condition Date/Time Comment    Admit Stable Sun Sep 29, 2019  4:15 PM Case was discussed with [facility] and the patient's admission status was agreed to be Admission Status: inpatient status to the service of Dr Mabel Veronica   Follow-up Information    None       Date, Time and Cause of Death    Date of Death:  9/29/19  Time of Death:   9:44 PM  Preliminary Cause of Death:  Ruptured aneurysm [facility] artery ([facility] )  Entered by:  Dr Charisma Holliday     JL1 [id number] Shubham Chowdary MD 09/29/19 22:02        Discharge Medication List as of 9/30/2019  3:58 AM      CONTINUE these medications which have NOT CHANGED    Details   amLODIPine (NORVASC) 10 mg tablet Take 10 mg by mouth daily, Historical Med      chlorthalidone 25 mg tablet Take 25 mg by mouth daily, Historical Med      lisinopril (ZESTRIL) 40 mg tablet Take 40 mg by mouth daily, Historical Med      simvastatin (ZOCOR) 40 mg tablet Take 40 mg by mouth daily at bedtime, Historical Med           No discharge procedures on file  ED Provider  Attending physically available and evaluated Nic Edmonds I managed the patient along with the ED Attending      Electronically Signed by         Anirudh Bone MD  10/01/19 0031       Anirudh Bone MD  10/01/19 5305

## 2019-09-30 LAB
ATRIAL RATE: 47 BPM
ATRIAL RATE: 90 BPM
ATRIAL RATE: 92 BPM
P AXIS: 70 DEGREES
P AXIS: 85 DEGREES
PR INTERVAL: 121 MS
PR INTERVAL: 150 MS
QRS AXIS: 1 DEGREES
QRS AXIS: 41 DEGREES
QRS AXIS: 42 DEGREES
QRSD INTERVAL: 121 MS
QRSD INTERVAL: 125 MS
QRSD INTERVAL: 136 MS
QT INTERVAL: 471 MS
QT INTERVAL: 476 MS
QT INTERVAL: 488 MS
QTC INTERVAL: 583 MS
QTC INTERVAL: 591 MS
QTC INTERVAL: 598 MS
T WAVE AXIS: 147 DEGREES
T WAVE AXIS: 147 DEGREES
T WAVE AXIS: 158 DEGREES
VENTRICULAR RATE: 90 BPM
VENTRICULAR RATE: 92 BPM
VENTRICULAR RATE: 93 BPM

## 2019-09-30 PROCEDURE — 99238 HOSP IP/OBS DSCHRG MGMT 30/<: CPT | Performed by: ANESTHESIOLOGY

## 2019-09-30 NOTE — PROGRESS NOTES
Dr Johnny Zavala at bedside beginning CVC placement  RN noted rhythm change from NSR to VFIB arrest  Please see Dr Kathleen Lemus note

## 2019-09-30 NOTE — RESPIRATORY THERAPY NOTE
RT Ventilator Management Note  Keyla Bo 63 y o  male MRN: [id number]  Unit/Bed#: [facility] 12 Encounter: [id number]      Daily Screen     No data found in the last 10 encounters  Physical Exam:   Assessment Type: (P) Assess only  General Appearance: (P) Unresponsive  Respiratory Pattern: (P) Assisted  Chest Assessment: (P) Chest expansion symmetrical  Bilateral Breath Sounds: (P) Clear, Diminished  Cough: (P) None  Suction: (P) ET Tube  O2 Device: (P) Ventilator      Resp Comments: (P) Pt  [facility]  Pt  placed back on AC mode settings  Will continue to assess and monitor pt  per protocol

## 2019-09-30 NOTE — UTILIZATION REVIEW
Initial Clinical Review    Admission: Date/Time/Statement:   Inpatient Admission Orders (From admission, onward)     Ordered        09/29/19 1617  Inpatient Admission (expected length of stay for this patient Order details is greater than two midnights)  Once                   Orders Placed This Encounter   Procedures    Inpatient Admission (expected length of stay for this patient Order details is greater than two midnights)     Standing Status:   Standing     Number of Occurrences:   1     Order Specific Question:   Admitting Physician     Answer:   Opal Irizarry [[id number]]     Order Specific Question:   Level of Care     Answer:   Critical Care [15]     Order Specific Question:   Estimated length of stay     Answer:   More than 2 Midnights     Order Specific Question:   Certification     Answer:   I certify that inpatient services are medically necessary for this patient for a duration of greater than two midnights  See H&P and MD Progress Notes for additional information about the patient's course of treatment  ED Arrival Information     Expected Arrival Acuity Means of Arrival Escorted By Service Admission Type    - 9/29/2019 15:51 - Ambulance [facility] Medicine Emergency    Arrival Complaint    -        Assessment/Plan:  63 y o  male who presents after being found down at home  History is from the patient's wife and the ER record since the patient is intubated and unable to provide his own history  Mr Selina Hayes has a known history of hypertension and hyperlipidemia  He was in his usual state of health today  His family spoke to him at [employer]  He was supposed to be at work at noon  When they arrived home at 2:30PM, they found him naked on the floor  EMS was called  Upon the arrival of EMS  EMS found the patient in ventricular tachycardia and v fib  He received 3 shocks 5 doses of Epi  In the ER, he had ROSC  His EKG revealed ST elevations and an MI alert was called   The patient lost pulses and required ACLS protocol several times  ROSC was obtained and he was transferred to the ICU for further care  Assessment/Plan:  Cardiac arrest  STEMI  Acute hypoxic and hypercapnic respiratory failure  Lactic acidosis  Anion gapped metabolic acidosis  Hyperglycemia  Acute kidney injury  Abnormal liver function test  Hypokalemia  Hypoalbuminemia     Severe neurologic injury  Exam is consistent with brain death; however given metabolic arrangements, will need to repeat exam with formal apnea testing   - Cont  Ventilator support  Check ABG and adjust respiratory rate and oxygen as indicated  - Place TLC  Cont  IV pressors to keep MAP >65  - Place arterial line  - IVF with bicarbonate hydration  - Insulin drip  - Replete electrolytes  - CT head  If negative, can consider systemic heparin   - NPO  - No statin given abnormal LFTs  - DVT Px  - DNR      Summary of Hospital Course: Patient was admitted to the ICU after successful ROSC from the ED  He was started on a bicarb drip and had a R radial A-line placed  Patient had a small laceration/abrasion to his forehead and this, combined with the unclear story of why he was found unresponsive, prompted a stat head and neck CT  The CT scans showed 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  Patient was made a level 2 DNR by his family  Shortly after returning from the CT scan, the patient went into ventricular fibrillation  He was given 300mg IV amio along with calcium, bicarb, and magnesium before he was declared dead at 2144 via ruptured aneurysm and SAH          ED Triage Vitals   Temperature Pulse Respirations Blood Pressure SpO2   09/29/19 1733 09/29/19 1554 09/29/19 1555 09/29/19 1555 09/29/19 1555   (!) 94 6 °F (34 8 °C) 87 16 120/90 95 %      Temp Source Heart Rate Source Patient Position - Orthostatic VS BP Location FiO2 (%)   09/29/19 1733 09/29/19 1648 09/29/19 1648 09/29/19 1648 09/29/19 1733   Probe Monitor Lying Left arm 100      Pain Score       --                 Wt Readings from Last 1 Encounters:   09/29/19 93 7 kg (206 lb 9 1 oz)     Additional Vital Signs:   09/29/19 1630    92  20  53/27Abnormal   35      97 %   09/29/19 1625    94  20  47/26Abnormal   31      98 %   09/29/19 1624    95  20  60/28Abnormal   33      99 %   09/29/19 1620    100    138/98  109      98 %   09/29/19 16:09:11    122Abnormal   20          92 %   09/29/19 1609                92 %   09/29/19 16:06:22    121Abnormal   16  80/43Abnormal         92 %    09/29/19 16:06:21    147Abnormal   120Abnormal           83 %Abnormal    09/29/19 16:02:11    150Abnormal   104Abnormal           86 %Abnormal    09/29/19 1602    0Abnormal   16  80/42Abnormal         85 %Abnormal    09/29/19 1601        80/43Abnormal            09/29/19 16:00:12    57  14          81 %Abnormal    09/29/19 15:56:41    103  17          80 %Abnormal    09/29/19 15:55:51      17             09/29/19 1555    103  16  120/90        95 %   09/29/19 15:54:11    87                   Pertinent Labs/Diagnostic Test Results:   Results from last 7 days   Lab Units 09/29/19  1850 09/29/19  1607 09/29/19  1559   WBC Thousand/uL 33 45* 21 83*  --    HEMOGLOBIN g/dL 15 2 13 9  --    I STAT HEMOGLOBIN g/dl  --   --  15 3  15 6   HEMATOCRIT % 48 0 47 3  --    HEMATOCRIT, ISTAT %  --   --  45  46   PLATELETS Thousands/uL 488* 424*  --    BANDS PCT %  --  9*  --      Results from last 7 days   Lab Units 09/29/19  1850 09/29/19  1607 09/29/19  1559   SODIUM mmol/L 140 143  --    POTASSIUM mmol/L 3 9 2 2*  --    CHLORIDE mmol/L 105 104  --    CO2 mmol/L 21 21  --    CO2, I-STAT mmol/L  --   --  21  21   AGAP mmol/L  --   --  21*   ANION GAP mmol/L 14* 18*  --    BUN mg/dL 23 17  --    CREATININE mg/dL 2 13* 1 93*  --    EGFR ml/min/1 73sq m 33 37 51   CALCIUM mg/dL 8 4 8 8  --    CALCIUM, IONIZED, ISTAT mmol/L  --   --  1 09*  1 09* MAGNESIUM mg/dL 2 5  --   --    PHOSPHORUS mg/dL 7 0*  --   --      Results from last 7 days   Lab Units 09/29/19 1850 09/29/19  1607   AST U/L 236* 97*   ALT U/L 158* 102*   ALK PHOS U/L 115 107   TOTAL PROTEIN g/dL 6 8 6 4   ALBUMIN g/dL 3 4* 3 4*   TOTAL BILIRUBIN mg/dL 0 87 0 71   BILIRUBIN DIRECT mg/dL 0 24*  --      Results from last 7 days   Lab Units 09/29/19  1910   POC GLUCOSE mg/dl 386*     Results from last 7 days   Lab Units 09/29/19  1850 09/29/19  1607   GLUCOSE RANDOM mg/dL 430* 483*     Results from last 7 days   Lab Units 09/29/19 1850   PH ART  7 246*   PCO2 ART mm Hg 47 6*   PO2 ART mm Hg 399 1*   HCO3 ART mmol/L 20 2*   BASE EXC ART mmol/L -7 2   O2 CONTENT ART mL/dL 22 7   O2 HGB, ARTERIAL % 98 2*   ABG SOURCE  Line, Arterial     Results from last 7 days   Lab Units 09/29/19  1559   PH, JOHANNA I-STAT  7 003*   PCO2, JOHANNA ISTAT mm HG 75 4*   PO2, JOHANNA ISTAT mm HG 48 0*   HCO3, JOHANNA ISTAT mmol/L 18 7*   I STAT BASE EXC mmol/L -14*   I STAT O2 SAT % 61*     Results from last 7 days   Lab Units 09/29/19  1850   CK TOTAL U/L 1,016*   CK MB INDEX % 8 1*   CK MB ng/mL 81 8*     Results from last 7 days   Lab Units 09/29/19  1850   TROPONIN I ng/mL 12 00*     Results from last 7 days   Lab Units 09/29/19  1850 09/29/19  1640   LACTIC ACID mmol/L 6 8* 12 3*     EKG consistent with STEMI     CT brain 9/29 -- 1  Diffuse subarachnoid and intraventricular hemorrhage likely related to ruptured aneurysm  2   Focal hyperdensity at the prepontine cistern measuring up to 1 9 cm in size likely aneurysm 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 tip or posterior cerebral artery      ED Treatment:   Medication Administration from 09/29/2019 1551 to 09/29/2019 1700       Date/Time Order Dose Route Action     09/29/2019 1600 EPINEPHrine (ADRENALIN) injection 1 mg Intravenous Given     09/29/2019 1603 sodium bicarbonate 50 mEq/50 mL injection 50 mEq Intravenous Given     09/29/2019 1604 EPINEPHrine (ADRENALIN) injection 1 mg Intravenous Given     09/29/2019 1623 potassium chloride 20 mEq IVPB (premix) 20 mEq Intravenous New Bag     09/29/2019 1637 norepinephrine (LEVOPHED) 4 mg (STANDARD CONCENTRATION) IV in sodium chloride 0 9% 250 mL 25 mcg/min Intravenous Rate/Dose Change     09/29/2019 1627 norepinephrine (LEVOPHED) 4 mg (STANDARD CONCENTRATION) IV in sodium chloride 0 9% 250 mL 20 mcg/min Intravenous Rate/Dose Change     09/29/2019 1625 norepinephrine (LEVOPHED) 4 mg (STANDARD CONCENTRATION) IV in sodium chloride 0 9% 250 mL 10 mcg/min Intravenous Rate/Dose Change     09/29/2019 1608 norepinephrine (LEVOPHED) 4 mg (STANDARD CONCENTRATION) IV in sodium chloride 0 9% 250 mL 5 mcg/min Intravenous New Bag        Past Medical History:   Diagnosis Date    Diabetes mellitus ([facility] )     PRE-DIABETES    Hypertension      Present on Admission:   Cardiac arrest with ventricular fibrillation ([facility] )   [facility]   Lactic acidosis   Shock [facility]   Hyperglycemia   Metabolic acidosis   Acute respiratory failure with hypoxia and hypercapnia (HCC)   Hypokalemia   HARSHA (acute kidney injury) ([facility] )      Admitting Diagnosis: Cardiac arrest ([facility] ) [I46 9]  Age/Sex: 63 y o  male    Network Utilization Review Department  Phone: [phone]; Fax [phone]  [email or web address]  ATTENTION: Please call with any questions or concerns to [phone]  and carefully listen to the prompts so that you are directed to the right person  Send all requests for admission clinical reviews, approved or denied determinations and any other requests to fax [phone]   All voicemails are confidential

## 2019-09-30 NOTE — RESPIRATORY THERAPY NOTE
RT Ventilator Management Note  Sonido Sun 63 y o  male MRN: [id number]  Unit/Bed#: [facility] 12 Encounter: [id number]      Daily Screen     No data found in the last 10 encounters  Physical Exam:   Assessment Type: Assess only  General Appearance: Unresponsive  Respiratory Pattern: Assisted  Chest Assessment: Chest expansion symmetrical  Bilateral Breath Sounds: Clear, Diminished  Cough: None  Suction: ET Tube  O2 Device: Ventilator      Resp Comments: (P) Pt   at this time and taken off ventilator support

## 2019-09-30 NOTE — DISCHARGE SUMMARY
Discharge Summary - Jonel Echeverria 63 y o  male MRN: [id number]    Unit/Bed#: MICU 12 Encounter: [id number] PCP: No primary care provider on file  Admission Date:   Admission Orders (From admission, onward)     Ordered        09/29/19 1617  Inpatient Admission (expected length of stay for this patient Order details is greater than two midnights)  Once                     Admitting Diagnosis: Cardiac arrest [facility]) [I46 [id number]]    HPI: Jonel Echeverria is a 63 y o  male who presents after being found down at home  History is from the patient's wife and the ER record since the patient is intubated and unable to provide his own history  Mr Kike Sales has a known history of hypertension and hyperlipidemia  He was in his usual state of health today  His family spoke to him at [employer]  He was supposed to be at work at noon  When they arrived home at 2:30PM, they found him naked on the floor  EMS was called  Upon the arrival of EMS  EMS found the patient in ventricular tachycardia and v fib  He received 3 shocks 5 doses of Epi  In the ER, he had ROSC  His EKG revealed ST elevations and an MI alert was called  The patient lost pulses and required ACLS protocol several times  ROSC was obtained and he was transferred to the ICU for further care      In the ICU, the patient is found comatose, unresponsive, and on the ventilator  He is on Norepinephrine         Procedures Performed: No orders of the defined types were placed in this encounter  Summary of Hospital Course: Patient was admitted to the ICU after successful ROSC from the ED  He was started on a bicarb drip and had a R radial A-line placed  Patient had a small laceration/abrasion to his forehead and this, combined with the unclear story of why he was found unresponsive, prompted a stat head and neck CT  The CT scans showed 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

## 2019-09-30 NOTE — DEATH NOTE
Death Pronouncement Note    Called to bedside by RN  Patient is identified visually and identification confirmed with identification bracelet  All lines and tubes intact  Patient unresponsive to stimuli  No spontaneous respirations  No palpable pulse or audible heart sounds  Pupils fixed bilaterally  Asystolic on two contiguous leads  Time of death: 2144    Family present at bedside  Attending notified  [facility] office called by RN